# Patient Record
Sex: FEMALE | Race: WHITE | URBAN - METROPOLITAN AREA
[De-identification: names, ages, dates, MRNs, and addresses within clinical notes are randomized per-mention and may not be internally consistent; named-entity substitution may affect disease eponyms.]

---

## 2023-01-26 ENCOUNTER — OFFICE VISIT (OUTPATIENT)
Dept: ENDOCRINOLOGY | Age: 53
End: 2023-01-26
Payer: COMMERCIAL

## 2023-01-26 VITALS
HEIGHT: 66 IN | OXYGEN SATURATION: 97 % | SYSTOLIC BLOOD PRESSURE: 150 MMHG | WEIGHT: 271 LBS | HEART RATE: 67 BPM | BODY MASS INDEX: 43.55 KG/M2 | DIASTOLIC BLOOD PRESSURE: 100 MMHG

## 2023-01-26 DIAGNOSIS — R73.9 HYPERGLYCEMIA: ICD-10-CM

## 2023-01-26 DIAGNOSIS — E03.9 PRIMARY HYPOTHYROIDISM: Primary | ICD-10-CM

## 2023-01-26 DIAGNOSIS — E03.9 PRIMARY HYPOTHYROIDISM: ICD-10-CM

## 2023-01-26 DIAGNOSIS — E66.01 SEVERE OBESITY (BMI >= 40) (HCC): ICD-10-CM

## 2023-01-26 LAB
ALBUMIN SERPL-MCNC: 3.8 G/DL (ref 3.5–5)
ALBUMIN/GLOB SERPL: 1.1 (ref 0.4–1.6)
ALP SERPL-CCNC: 82 U/L (ref 50–136)
ALT SERPL-CCNC: 12 U/L (ref 12–65)
ANION GAP SERPL CALC-SCNC: 6 MMOL/L (ref 2–11)
AST SERPL-CCNC: 8 U/L (ref 15–37)
BILIRUB SERPL-MCNC: 0.4 MG/DL (ref 0.2–1.1)
BUN SERPL-MCNC: 10 MG/DL (ref 6–23)
CALCIUM SERPL-MCNC: 9.2 MG/DL (ref 8.3–10.4)
CHLORIDE SERPL-SCNC: 104 MMOL/L (ref 101–110)
CO2 SERPL-SCNC: 30 MMOL/L (ref 21–32)
CREAT SERPL-MCNC: 0.7 MG/DL (ref 0.6–1)
EST. AVERAGE GLUCOSE BLD GHB EST-MCNC: 114 MG/DL
GLOBULIN SER CALC-MCNC: 3.5 G/DL (ref 2.8–4.5)
GLUCOSE SERPL-MCNC: 86 MG/DL (ref 65–100)
HBA1C MFR BLD: 5.6 % (ref 4.8–5.6)
POTASSIUM SERPL-SCNC: 4 MMOL/L (ref 3.5–5.1)
PROT SERPL-MCNC: 7.3 G/DL (ref 6.3–8.2)
SODIUM SERPL-SCNC: 140 MMOL/L (ref 133–143)
T4 FREE SERPL-MCNC: 1.1 NG/DL (ref 0.78–1.46)
TSH, 3RD GENERATION: 3.52 UIU/ML (ref 0.36–3.74)

## 2023-01-26 PROCEDURE — 99204 OFFICE O/P NEW MOD 45 MIN: CPT | Performed by: INTERNAL MEDICINE

## 2023-01-26 RX ORDER — ALBUTEROL SULFATE 90 UG/1
AEROSOL, METERED RESPIRATORY (INHALATION)
COMMUNITY
Start: 2023-01-24

## 2023-01-26 RX ORDER — LEVOTHYROXINE SODIUM 0.1 MG/1
100 TABLET ORAL DAILY
Qty: 90 TABLET | Refills: 3
Start: 2023-01-26

## 2023-01-26 RX ORDER — IBUPROFEN 600 MG/1
600 TABLET ORAL EVERY 6 HOURS PRN
COMMUNITY
Start: 2020-12-10

## 2023-01-26 RX ORDER — CITALOPRAM 10 MG/1
TABLET ORAL
COMMUNITY
Start: 2023-01-07

## 2023-01-26 RX ORDER — ESTRADIOL 1 MG/1
TABLET ORAL
COMMUNITY
Start: 2023-01-07

## 2023-01-26 RX ORDER — LEVOTHYROXINE SODIUM 0.1 MG/1
TABLET ORAL
COMMUNITY
Start: 2023-01-07 | End: 2023-01-26 | Stop reason: SDUPTHER

## 2023-01-26 RX ORDER — BENZONATATE 100 MG/1
CAPSULE ORAL
COMMUNITY
Start: 2022-11-28

## 2023-01-26 RX ORDER — DEXTROMETHORPHAN POLISTIREX 30 MG/5ML
SUSPENSION ORAL
COMMUNITY
Start: 2022-12-10

## 2023-01-26 ASSESSMENT — ENCOUNTER SYMPTOMS
TROUBLE SWALLOWING: 0
DIARRHEA: 1
CONSTIPATION: 1
VOICE CHANGE: 1
SHORTNESS OF BREATH: 1

## 2023-01-26 NOTE — PROGRESS NOTES
Bailee Francis MD, [address]        Reason for visit: Betsy Estrada is referred by Dr. Gracia Partida ([facility] in [place], [place]) for the evaluation and management of \"hypothyroidism, unspecified type; morbid obesity with BMI of 40.0-44.9, adult). ASSESSMENT AND PLAN:    1. Primary hypothyroidism  Ms. Johnie Veloz is referred by her cardiothoracic surgeon for evaluation of hypothyroidism. She has been biochemically euthyroid on her current levothyroxine dose, excluding her thyroid as a source of signs or symptoms. I will recheck thyroid function today and notify her of results. If normal, she can continue to follow with JUNIOR Blakely NP who has provided her with excellent management of hypothyroidism. I recommend that nonhormonal causes of her signs and symptoms be investigated. - levothyroxine (SYNTHROID) 100 MCG tablet; Take 1 tablet by mouth Daily  Dispense: 90 tablet; Refill: 3  - TSH; Future  - T4, Free; Future    2. Severe obesity (BMI >= 40) ([address]  Ms. Johnie Veloz is also referred for evaluation and management of morbid obesity. I am not an expert in the evaluation or management of obesity. However, I should note that she does not follow any particular diet and does not exercise at all. I would recommend nutritional counseling and prescription of an exercise program as a starting point. 3. Hyperglycemia  She has at times had mild hyperglycemia (I am not sure if those blood specimens were resolved in the fasting state). I will check labs as below today to screen her for diabetes. - Comprehensive Metabolic Panel; Future  - Hemoglobin A1C; Future      Follow-up and Dispositions    Return if symptoms worsen or fail to improve.          History of Present Illness:    THYROID DYSFUNCTION  Velma Hernandez is seen for new evaluation/management of hypothyroidism; this was diagnosed in her mid 40s.      Current symptoms:  See review of systems below    Prior treatment: She has been on levothyroxine since diagnosis (100 mcg daily since 2021 or 2022). Pertinent labs:  10/25/2016: TSH 7.14.  12/30/2016: TSH 2.65.  6/30/2017: TSH 4.64.  10/20/2017: TSH 1.77.  12/27/2018: TSH 3.76.  7/14/2020: TSH 5.483.  1/14/2021: TSH 3.009.  1/7/2022: TSH 2.574. Imaging: none      Medical/Surgical/Social/Family History:  Past Medical History:   Diagnosis Date    Anxiety disorder     Gastroesophageal reflux disease     Nephrolithiasis     Primary hypothyroidism     Pulmonary nodules        Past Surgical History:   Procedure Laterality Date    DILATION AND CURETTAGE OF UTERUS      ENDOMETRIAL ABLATION      HYSTEROSCOPY      KIDNEY STONE SURGERY      BABAK AND BSO (CERVIX REMOVED)      TOOTH EXTRACTION      TUBAL LIGATION         Past Social History: reviewed in chart    Family History   Problem Relation Age of Onset    Hypothyroidism Sister     Cancer Maternal Grandmother     Breast Cancer Maternal Aunt     Diabetes Half-Brother     Thyroid Cancer Neg Hx        Medications  Reviewed in chart    Allergies  Patient has no known allergies. Review of Systems   Constitutional:  Positive for fatigue and unexpected weight change (gained 25 pounds in the last year; she does not exercise or follow a specific diet). HENT:  Positive for voice change (hoarseness when tired). Negative for trouble swallowing. Eyes:  Positive for visual disturbance (occasional blurriness). Respiratory:  Positive for shortness of breath (with walking). Cardiovascular:  Positive for palpitations (only when anxious). Gastrointestinal:  Positive for constipation and diarrhea. Endocrine: Positive for heat intolerance. Negative for cold intolerance. Genitourinary:  Negative for menstrual problem and vaginal bleeding. Musculoskeletal:  Positive for arthralgias (knees). Skin:  Negative for rash.    Neurological:  Negative for dizziness, tremors and light-headedness. Psychiatric/Behavioral:  The patient is nervous/anxious. BP (!) 150/100 (Site: Left Upper Arm, Position: Sitting)   Pulse 67   Ht 5' 6\" (1.676 m)   Wt 271 lb (122.9 kg)   SpO2 97%   BMI 43.74 kg/m²   Wt Readings from Last 3 Encounters:   01/26/23 271 lb (122.9 kg)       Physical Exam  HENT:      Head: Normocephalic. Neck:      Thyroid: No thyroid mass or thyromegaly. Cardiovascular:      Rate and Rhythm: Normal rate. Pulmonary:      Effort: No respiratory distress. Breath sounds: Normal breath sounds. Neurological:      Mental Status: She is alert. Psychiatric:         Mood and Affect: Mood normal.         Behavior: Behavior normal.       Orders Placed This Encounter   Procedures    TSH     Standing Status:   Future     Standing Expiration Date:   1/26/2024    T4, Free     Standing Status:   Future     Standing Expiration Date:   1/26/2024    Comprehensive Metabolic Panel     Standing Status:   Future     Standing Expiration Date:   1/26/2024    Hemoglobin A1C     Standing Status:   Future     Standing Expiration Date:   1/26/2024         Current Outpatient Medications   Medication Sig Dispense Refill    ibuprofen (ADVIL;MOTRIN) 600 MG tablet Take 600 mg by mouth every 6 hours as needed      dextromethorphan (DELSYM) 30 MG/5ML extended release liquid TAKE 5 ML BY MOUTH TWICE DAILY AS NEEDED FOR COUGH      benzonatate (TESSALON) 100 MG capsule       estradiol (ESTRACE) 1 MG tablet       citalopram (CELEXA) 10 MG tablet       albuterol sulfate HFA (PROVENTIL;VENTOLIN;PROAIR) 108 (90 Base) MCG/ACT inhaler       levothyroxine (SYNTHROID) 100 MCG tablet Take 1 tablet by mouth Daily 90 tablet 3     No current facility-administered medications for this visit. Maricel Lauren MD, FACE      Portions of this note were generated with the assistance of voice recognition software. As such, some errors in transcription may be present.